# Patient Record
Sex: FEMALE | Race: BLACK OR AFRICAN AMERICAN | NOT HISPANIC OR LATINO | URBAN - METROPOLITAN AREA
[De-identification: names, ages, dates, MRNs, and addresses within clinical notes are randomized per-mention and may not be internally consistent; named-entity substitution may affect disease eponyms.]

---

## 2018-01-08 ENCOUNTER — OFFICE VISIT CONVERTED (OUTPATIENT)
Dept: ORTHOPEDIC SURGERY | Facility: CLINIC | Age: 43
End: 2018-01-08
Attending: ORTHOPAEDIC SURGERY

## 2018-06-19 ENCOUNTER — OFFICE VISIT CONVERTED (OUTPATIENT)
Dept: ORTHOPEDIC SURGERY | Facility: CLINIC | Age: 43
End: 2018-06-19
Attending: ORTHOPAEDIC SURGERY

## 2018-07-18 ENCOUNTER — OFFICE VISIT CONVERTED (OUTPATIENT)
Dept: ORTHOPEDIC SURGERY | Facility: CLINIC | Age: 43
End: 2018-07-18
Attending: PHYSICIAN ASSISTANT

## 2018-08-13 ENCOUNTER — OFFICE VISIT CONVERTED (OUTPATIENT)
Dept: ORTHOPEDIC SURGERY | Facility: CLINIC | Age: 43
End: 2018-08-13
Attending: PHYSICIAN ASSISTANT

## 2018-09-18 ENCOUNTER — OFFICE VISIT CONVERTED (OUTPATIENT)
Dept: ORTHOPEDIC SURGERY | Facility: CLINIC | Age: 43
End: 2018-09-18
Attending: ORTHOPAEDIC SURGERY

## 2018-10-16 ENCOUNTER — OFFICE VISIT CONVERTED (OUTPATIENT)
Dept: ORTHOPEDIC SURGERY | Facility: CLINIC | Age: 43
End: 2018-10-16
Attending: ORTHOPAEDIC SURGERY

## 2019-09-26 ENCOUNTER — OFFICE VISIT CONVERTED (OUTPATIENT)
Dept: PLASTIC SURGERY | Facility: CLINIC | Age: 44
End: 2019-09-26
Attending: PLASTIC SURGERY

## 2019-10-17 ENCOUNTER — PROCEDURE VISIT CONVERTED (OUTPATIENT)
Dept: PLASTIC SURGERY | Facility: CLINIC | Age: 44
End: 2019-10-17
Attending: PLASTIC SURGERY

## 2019-10-24 ENCOUNTER — OFFICE VISIT CONVERTED (OUTPATIENT)
Dept: PLASTIC SURGERY | Facility: CLINIC | Age: 44
End: 2019-10-24
Attending: PLASTIC SURGERY

## 2020-04-06 ENCOUNTER — TELEMEDICINE CONVERTED (OUTPATIENT)
Dept: PLASTIC SURGERY | Facility: CLINIC | Age: 45
End: 2020-04-06
Attending: NURSE PRACTITIONER

## 2020-06-10 ENCOUNTER — HOSPITAL ENCOUNTER (OUTPATIENT)
Dept: CARDIOLOGY | Facility: HOSPITAL | Age: 45
Discharge: HOME | End: 2020-06-10
Attending: EMERGENCY MEDICINE

## 2021-05-12 NOTE — PROGRESS NOTES
Quick Note      Patient Name: Josh Coreas   Patient ID: [id number]   Sex: Female   YOB: 1975    Primary Care Provider: Tyler Rosa MD   Referring Provider: Sony GORE    Visit Date: April 6, 2020    Provider: Charlotte Mar MD   Location: [facility]   Location Address: [address] [place] [address]  [id number]   Location Phone: [phone]          History Of Present Illness  TELEHEALTH VISIT  Chief Complaint: Pre-op Mini TT, Implant exchange, Mastopexty   Josh Coreas is a 44 year old /Black female who is presenting for evaluation via [facility] telehealth visit. Verbal consent obtained before beginning visit. Pt is doing well. Would like to proceed.   Provider spent 5 minutes with the patient during telehealth visit.   The following staff were present during this visit: TOMMIE Mane APRN   Past Medical History/Overview of Patient Symptoms      Physical Examination     Alert, awake, and oriented x3.           Assessment  · Excessive and redundant skin and subcutaneous tissue     701.9/L98.7  · Ptosis of breast     611.81/N64.81      Plan  · Orders  o Physican Telephone evaluation, 5-10 min ([id number]) - - 04/06/2020  · Medications  o Medications have been Reconciled  o Transition of Care or Provider Policy  · Instructions  o Plan Of Care: Discussed possible postponement depending on the Covid-19 pandemic status. Still on schedule for 4/29/20 but may not be able to perform elective surgeries at that time. Will notify with any updates.             Electronically Signed by: BAO Yanez -Author on April 7, 2020 02:16:41 PM

## 2021-05-15 VITALS
HEART RATE: 100 BPM | BODY MASS INDEX: 25.03 KG/M2 | DIASTOLIC BLOOD PRESSURE: 81 MMHG | WEIGHT: 136 LBS | HEIGHT: 62 IN | SYSTOLIC BLOOD PRESSURE: 120 MMHG | OXYGEN SATURATION: 99 %

## 2021-05-15 VITALS
OXYGEN SATURATION: 98 % | DIASTOLIC BLOOD PRESSURE: 82 MMHG | HEART RATE: 74 BPM | SYSTOLIC BLOOD PRESSURE: 117 MMHG | WEIGHT: 141.25 LBS | HEIGHT: 62 IN | BODY MASS INDEX: 25.99 KG/M2

## 2021-05-16 VITALS — HEART RATE: 100 BPM | BODY MASS INDEX: 25.76 KG/M2 | WEIGHT: 140 LBS | OXYGEN SATURATION: 97 % | HEIGHT: 62 IN

## 2021-05-16 VITALS — HEIGHT: 62 IN | RESPIRATION RATE: 16 BRPM | WEIGHT: 145 LBS | BODY MASS INDEX: 26.68 KG/M2

## 2021-05-16 VITALS — HEIGHT: 62 IN | WEIGHT: 145 LBS | RESPIRATION RATE: 18 BRPM | BODY MASS INDEX: 26.68 KG/M2

## 2021-05-16 VITALS — WEIGHT: 145 LBS | BODY MASS INDEX: 26.68 KG/M2 | RESPIRATION RATE: 16 BRPM | HEIGHT: 62 IN

## 2021-05-16 VITALS — WEIGHT: 145 LBS | RESPIRATION RATE: 16 BRPM | HEIGHT: 62 IN | BODY MASS INDEX: 26.68 KG/M2

## 2021-05-16 VITALS — HEIGHT: 62 IN | BODY MASS INDEX: 26.68 KG/M2 | WEIGHT: 145 LBS | RESPIRATION RATE: 16 BRPM
